# Patient Record
Sex: FEMALE | Race: WHITE | NOT HISPANIC OR LATINO
[De-identification: names, ages, dates, MRNs, and addresses within clinical notes are randomized per-mention and may not be internally consistent; named-entity substitution may affect disease eponyms.]

---

## 2017-11-10 ENCOUNTER — MEDICATION RENEWAL (OUTPATIENT)
Age: 52
End: 2017-11-10

## 2017-11-10 ENCOUNTER — MOBILE ON CALL (OUTPATIENT)
Age: 52
End: 2017-11-10

## 2017-11-10 ENCOUNTER — APPOINTMENT (OUTPATIENT)
Dept: DERMATOLOGY | Facility: CLINIC | Age: 52
End: 2017-11-10
Payer: MEDICAID

## 2017-11-10 VITALS — DIASTOLIC BLOOD PRESSURE: 68 MMHG | SYSTOLIC BLOOD PRESSURE: 130 MMHG

## 2017-11-10 DIAGNOSIS — Z80.8 FAMILY HISTORY OF MALIGNANT NEOPLASM OF OTHER ORGANS OR SYSTEMS: ICD-10-CM

## 2017-11-10 DIAGNOSIS — Z80.9 FAMILY HISTORY OF MALIGNANT NEOPLASM, UNSPECIFIED: ICD-10-CM

## 2017-11-10 PROCEDURE — 99214 OFFICE O/P EST MOD 30 MIN: CPT | Mod: GC

## 2017-11-10 RX ORDER — LIDOCAINE 5 G/100G
5 OINTMENT TOPICAL
Qty: 35 | Refills: 0 | Status: ACTIVE | COMMUNITY
Start: 2017-05-11

## 2017-11-10 RX ORDER — CETIRIZINE HYDROCHLORIDE 10 MG/1
10 CAPSULE, LIQUID FILLED ORAL
Qty: 1 | Refills: 1 | Status: ACTIVE | COMMUNITY
Start: 2017-11-10 | End: 1900-01-01

## 2017-11-10 RX ORDER — DEXAMETHASONE 4 MG/1
4 TABLET ORAL
Qty: 12 | Refills: 0 | Status: ACTIVE | COMMUNITY
Start: 2017-05-15

## 2017-11-12 RX ORDER — CIPROFLOXACIN HYDROCHLORIDE 250 MG/1
250 TABLET, FILM COATED ORAL
Qty: 6 | Refills: 0 | Status: DISCONTINUED | COMMUNITY
Start: 2017-05-22 | End: 2017-11-12

## 2017-11-12 RX ORDER — AZITHROMYCIN 250 MG/1
250 TABLET, FILM COATED ORAL
Qty: 6 | Refills: 0 | Status: DISCONTINUED | COMMUNITY
Start: 2017-07-25 | End: 2017-11-12

## 2017-11-13 ENCOUNTER — MEDICATION RENEWAL (OUTPATIENT)
Age: 52
End: 2017-11-13

## 2017-11-13 RX ORDER — FEXOFENADINE HYDROCHLORIDE 180 MG/1
180 TABLET, FILM COATED ORAL DAILY
Qty: 1 | Refills: 3 | Status: ACTIVE | COMMUNITY
Start: 2017-11-13 | End: 1900-01-01

## 2017-11-13 RX ORDER — LORATADINE 10 MG/1
10 TABLET ORAL
Qty: 1 | Refills: 1 | Status: ACTIVE | COMMUNITY
Start: 2017-11-13 | End: 1900-01-01

## 2017-12-01 ENCOUNTER — OUTPATIENT (OUTPATIENT)
Dept: OUTPATIENT SERVICES | Facility: HOSPITAL | Age: 52
LOS: 1 days | End: 2017-12-01
Payer: MEDICAID

## 2017-12-08 ENCOUNTER — APPOINTMENT (OUTPATIENT)
Dept: DERMATOLOGY | Facility: CLINIC | Age: 52
End: 2017-12-08
Payer: MEDICAID

## 2017-12-08 DIAGNOSIS — L60.1 ONYCHOLYSIS: ICD-10-CM

## 2017-12-08 DIAGNOSIS — I78.1 NEVUS, NON-NEOPLASTIC: ICD-10-CM

## 2017-12-08 PROCEDURE — 99213 OFFICE O/P EST LOW 20 MIN: CPT | Mod: GC

## 2017-12-15 DIAGNOSIS — R69 ILLNESS, UNSPECIFIED: ICD-10-CM

## 2018-01-31 ENCOUNTER — LABORATORY RESULT (OUTPATIENT)
Age: 53
End: 2018-01-31

## 2018-02-01 ENCOUNTER — APPOINTMENT (OUTPATIENT)
Dept: DERMATOLOGY | Facility: CLINIC | Age: 53
End: 2018-02-01
Payer: MEDICAID

## 2018-02-01 ENCOUNTER — MOBILE ON CALL (OUTPATIENT)
Age: 53
End: 2018-02-01

## 2018-02-01 PROCEDURE — 11100 BX SKIN SUBCUTANEOUS&/MUCOUS MEMBRANE 1 LESION: CPT

## 2018-02-01 PROCEDURE — 99214 OFFICE O/P EST MOD 30 MIN: CPT | Mod: 25

## 2018-02-01 RX ORDER — PROCHLORPERAZINE MALEATE 10 MG/1
10 TABLET ORAL
Qty: 30 | Refills: 0 | Status: DISCONTINUED | COMMUNITY
Start: 2017-05-15 | End: 2018-02-01

## 2018-02-01 RX ORDER — CLOBETASOL PROPIONATE 0.5 MG/G
0.05 OINTMENT TOPICAL TWICE DAILY
Qty: 1 | Refills: 2 | Status: ACTIVE | COMMUNITY
Start: 2017-11-10 | End: 1900-01-01

## 2018-02-01 RX ORDER — TAMOXIFEN CITRATE 20 MG/1
20 TABLET, FILM COATED ORAL
Qty: 30 | Refills: 0 | Status: DISCONTINUED | COMMUNITY
Start: 2017-08-07 | End: 2018-02-01

## 2018-02-01 RX ORDER — CHROMIUM 200 MCG
TABLET ORAL
Refills: 0 | Status: ACTIVE | COMMUNITY

## 2018-02-01 RX ORDER — SODIUM SULFATE, POTASSIUM SULFATE, MAGNESIUM SULFATE 17.5; 3.13; 1.6 G/ML; G/ML; G/ML
17.5-3.13-1.6 SOLUTION, CONCENTRATE ORAL
Qty: 354 | Refills: 0 | Status: DISCONTINUED | COMMUNITY
Start: 2017-10-25 | End: 2018-02-01

## 2018-02-01 RX ORDER — BACLOFEN 15 MG/1
TABLET ORAL
Refills: 0 | Status: ACTIVE | COMMUNITY

## 2018-02-13 ENCOUNTER — APPOINTMENT (OUTPATIENT)
Dept: DERMATOLOGY | Facility: CLINIC | Age: 53
End: 2018-02-13
Payer: MEDICAID

## 2018-02-13 DIAGNOSIS — Z48.02 ENCOUNTER FOR REMOVAL OF SUTURES: ICD-10-CM

## 2018-02-13 PROCEDURE — 99214 OFFICE O/P EST MOD 30 MIN: CPT

## 2018-03-01 ENCOUNTER — APPOINTMENT (OUTPATIENT)
Dept: DERMATOLOGY | Facility: CLINIC | Age: 53
End: 2018-03-01
Payer: MEDICAID

## 2018-03-01 VITALS — DIASTOLIC BLOOD PRESSURE: 60 MMHG | SYSTOLIC BLOOD PRESSURE: 120 MMHG

## 2018-03-01 DIAGNOSIS — L30.9 DERMATITIS, UNSPECIFIED: ICD-10-CM

## 2018-03-01 DIAGNOSIS — L58.9 RADIODERMATITIS, UNSPECIFIED: ICD-10-CM

## 2018-03-01 PROCEDURE — 99214 OFFICE O/P EST MOD 30 MIN: CPT

## 2018-03-01 RX ORDER — CLOBETASOL PROPIONATE 0.5 MG/G
0.05 CREAM TOPICAL
Qty: 1 | Refills: 2 | Status: ACTIVE | COMMUNITY
Start: 2018-03-01 | End: 1900-01-01

## 2018-03-13 ENCOUNTER — EMERGENCY (EMERGENCY)
Facility: HOSPITAL | Age: 53
LOS: 1 days | Discharge: HOME | End: 2018-03-13
Attending: EMERGENCY MEDICINE | Admitting: EMERGENCY MEDICINE
Payer: MEDICAID

## 2018-03-13 VITALS
TEMPERATURE: 99 F | RESPIRATION RATE: 16 BRPM | HEART RATE: 107 BPM | DIASTOLIC BLOOD PRESSURE: 78 MMHG | SYSTOLIC BLOOD PRESSURE: 125 MMHG | OXYGEN SATURATION: 98 %

## 2018-03-13 VITALS
DIASTOLIC BLOOD PRESSURE: 68 MMHG | SYSTOLIC BLOOD PRESSURE: 125 MMHG | RESPIRATION RATE: 16 BRPM | OXYGEN SATURATION: 98 % | HEART RATE: 102 BPM | WEIGHT: 156.09 LBS | HEIGHT: 62 IN | TEMPERATURE: 98 F

## 2018-03-13 PROCEDURE — 73130 X-RAY EXAM OF HAND: CPT | Mod: 26,LT

## 2018-03-13 PROCEDURE — 73130 X-RAY EXAM OF HAND: CPT

## 2018-03-13 PROCEDURE — 99283 EMERGENCY DEPT VISIT LOW MDM: CPT | Mod: 25

## 2018-03-13 PROCEDURE — 99283 EMERGENCY DEPT VISIT LOW MDM: CPT

## 2018-03-13 RX ORDER — AZTREONAM 2 G
1 VIAL (EA) INJECTION
Qty: 14 | Refills: 0
Start: 2018-03-13 | End: 2018-03-19

## 2018-03-13 RX ADMIN — Medication 1 TABLET(S): at 18:11

## 2018-03-13 RX ADMIN — Medication 100 MILLIGRAM(S): at 18:11

## 2018-03-13 NOTE — ED ADULT NURSE NOTE - OBJECTIVE STATEMENT
pt states on saturday night she got a piece of glass into her left second finger. pt denies pain but states she has some numbness from the lymphedema . no fevers noted, pt denies any discharge from wound.

## 2018-03-13 NOTE — ED PROVIDER NOTE - PROGRESS NOTE DETAILS
spoke with hand on call Dr. Brooks, case discussed, xrays reviewed, no sign of foreign body, recommend starting antibiotics, will see in the office tomorrow, explanied to the patient and agrees

## 2018-05-31 ENCOUNTER — APPOINTMENT (OUTPATIENT)
Dept: DERMATOLOGY | Facility: CLINIC | Age: 53
End: 2018-05-31

## 2018-06-01 PROCEDURE — G9001: CPT

## 2018-06-01 PROCEDURE — G9005: CPT

## 2018-07-01 ENCOUNTER — OUTPATIENT (OUTPATIENT)
Dept: OUTPATIENT SERVICES | Facility: HOSPITAL | Age: 53
LOS: 1 days | End: 2018-07-01
Payer: MEDICAID

## 2018-07-12 DIAGNOSIS — Z71.89 OTHER SPECIFIED COUNSELING: ICD-10-CM

## 2020-01-01 NOTE — ED PROVIDER NOTE - OBJECTIVE STATEMENT
52 female sent to ER by PMD Dr. Sofi Jaramillo, states on Saturday night a glass jar broke and while trying to clean it out she felt a glass shard enter into her left outer index finger, had minor bleeding and tried to squeeze it out. Since patient has felt foreign body sensation in that area associated with pain. Patient at [facility] has lymphedema of her left arm and rash because of history of double mastectomy with removal of lymph nodes on left arm. Patient denies fever and otherwise feels well. 52 female sent to ER by PMD Dr. Sofi Jaramillo, states on Saturday night a glass jar broke and while trying to clean it out she felt a glass shard enter into her left outer index finger, had minor bleeding and tried to squeeze it out. Since patient has felt foreign body sensation in that area associated with pain. Patient at baseline has lymphedema of her left arm and rash because of history of double mastectomy with removal of lymph nodes on left arm. Patient denies fever and otherwise feels well. <<-----Click here for Discharge Medication Review

## 2020-10-30 PROBLEM — I89.0 LYMPHEDEMA, NOT ELSEWHERE CLASSIFIED: Chronic | Status: ACTIVE | Noted: 2018-03-13

## 2020-10-30 PROBLEM — C50.919 MALIGNANT NEOPLASM OF UNSPECIFIED SITE OF UNSPECIFIED FEMALE BREAST: Chronic | Status: ACTIVE | Noted: 2018-03-13

## 2020-11-18 ENCOUNTER — LABORATORY RESULT (OUTPATIENT)
Age: 55
End: 2020-11-18

## 2020-11-18 ENCOUNTER — APPOINTMENT (OUTPATIENT)
Dept: DERMATOLOGY | Facility: CLINIC | Age: 55
End: 2020-11-18
Payer: MEDICAID

## 2020-11-18 DIAGNOSIS — D23.9 OTHER BENIGN NEOPLASM OF SKIN, UNSPECIFIED: ICD-10-CM

## 2020-11-18 DIAGNOSIS — D48.5 NEOPLASM OF UNCERTAIN BEHAVIOR OF SKIN: ICD-10-CM

## 2020-11-18 DIAGNOSIS — Z12.83 ENCOUNTER FOR SCREENING FOR MALIGNANT NEOPLASM OF SKIN: ICD-10-CM

## 2020-11-18 DIAGNOSIS — L21.9 SEBORRHEIC DERMATITIS, UNSPECIFIED: ICD-10-CM

## 2020-11-18 DIAGNOSIS — L57.0 ACTINIC KERATOSIS: ICD-10-CM

## 2020-11-18 DIAGNOSIS — Z86.03 PERSONAL HISTORY OF NEOPLASM OF UNCERTAIN BEHAVIOR: ICD-10-CM

## 2020-11-18 PROCEDURE — 11102 TANGNTL BX SKIN SINGLE LES: CPT

## 2020-11-18 PROCEDURE — 99214 OFFICE O/P EST MOD 30 MIN: CPT | Mod: 25

## 2020-11-18 PROCEDURE — 17000 DESTRUCT PREMALG LESION: CPT | Mod: 59

## 2020-11-20 ENCOUNTER — TRANSCRIPTION ENCOUNTER (OUTPATIENT)
Age: 55
End: 2020-11-20

## 2020-11-24 ENCOUNTER — NON-APPOINTMENT (OUTPATIENT)
Age: 55
End: 2020-11-24

## 2020-11-30 ENCOUNTER — APPOINTMENT (OUTPATIENT)
Dept: DERMATOLOGY | Facility: CLINIC | Age: 55
End: 2020-11-30

## 2021-07-09 ENCOUNTER — TRANSCRIPTION ENCOUNTER (OUTPATIENT)
Age: 56
End: 2021-07-09

## 2021-09-01 PROCEDURE — G9005: CPT

## 2021-12-09 ENCOUNTER — EMERGENCY (EMERGENCY)
Facility: HOSPITAL | Age: 56
LOS: 1 days | Discharge: HOME | End: 2021-12-09
Attending: EMERGENCY MEDICINE | Admitting: EMERGENCY MEDICINE
Payer: MEDICAID

## 2021-12-09 VITALS
WEIGHT: 154.98 LBS | SYSTOLIC BLOOD PRESSURE: 127 MMHG | HEART RATE: 92 BPM | RESPIRATION RATE: 18 BRPM | TEMPERATURE: 98 F | HEIGHT: 62 IN | DIASTOLIC BLOOD PRESSURE: 76 MMHG | OXYGEN SATURATION: 98 %

## 2021-12-09 PROCEDURE — 99283 EMERGENCY DEPT VISIT LOW MDM: CPT

## 2021-12-09 PROCEDURE — 99284 EMERGENCY DEPT VISIT MOD MDM: CPT

## 2021-12-09 RX ORDER — CYCLOBENZAPRINE HYDROCHLORIDE 10 MG/1
10 TABLET, FILM COATED ORAL ONCE
Refills: 0 | Status: COMPLETED | OUTPATIENT
Start: 2021-12-09 | End: 2021-12-09

## 2021-12-09 RX ORDER — IBUPROFEN 200 MG
600 TABLET ORAL ONCE
Refills: 0 | Status: COMPLETED | OUTPATIENT
Start: 2021-12-09 | End: 2021-12-09

## 2021-12-09 RX ORDER — DIMETHYL FUMARATE 240 MG/1
1 CAPSULE ORAL
Qty: 0 | Refills: 0 | DISCHARGE

## 2021-12-09 RX ORDER — CYCLOBENZAPRINE HYDROCHLORIDE 10 MG/1
1 TABLET, FILM COATED ORAL
Qty: 15 | Refills: 0
Start: 2021-12-09 | End: 2021-12-13

## 2021-12-09 RX ORDER — ASPIRIN/CALCIUM CARB/MAGNESIUM 324 MG
1 TABLET ORAL
Qty: 0 | Refills: 0 | DISCHARGE

## 2021-12-09 RX ORDER — OXYCODONE AND ACETAMINOPHEN 5; 325 MG/1; MG/1
1 TABLET ORAL ONCE
Refills: 0 | Status: DISCONTINUED | OUTPATIENT
Start: 2021-12-09 | End: 2021-12-09

## 2021-12-09 RX ADMIN — Medication 600 MILLIGRAM(S): at 03:41

## 2021-12-09 RX ADMIN — CYCLOBENZAPRINE HYDROCHLORIDE 10 MILLIGRAM(S): 10 TABLET, FILM COATED ORAL at 03:41

## 2021-12-09 RX ADMIN — Medication 600 MILLIGRAM(S): at 04:32

## 2021-12-09 NOTE — ED PROVIDER NOTE - PATIENT PORTAL LINK FT
You can access the FollowMyHealth Patient Portal offered by [facility] by registering at the following website: [email or web address]. By joining [employer]’s FollowMyHealth portal, you will also be able to view your health information using other applications (apps) compatible with our system.

## 2021-12-09 NOTE — ED ADULT NURSE NOTE - NSIMPLEMENTINTERV_GEN_ALL_ED
Implemented All Fall with Harm Risk Interventions:  [place] to call system. Call bell, personal items and telephone within reach. Instruct patient to call for assistance. Room bathroom lighting operational. Non-slip footwear when patient is off stretcher. Physically safe environment: no spills, clutter or unnecessary equipment. Stretcher in lowest position, wheels locked, appropriate side rails in place. Provide visual cue, wrist band, yellow gown, etc. Monitor gait and stability. Monitor for mental status changes and reorient to person, place, and time. Review medications for side effects contributing to fall risk. Reinforce activity limits and safety measures with patient and family. Provide visual clues: red socks. Island Pedicle Flap-Requiring Vessel Identification Text: The defect edges were debeveled with a #15 scalpel blade.  Given the location of the defect, shape of the defect and the proximity to free margins an island pedicle advancement flap was deemed most appropriate.  Using a sterile surgical marker, an appropriate advancement flap was drawn, based on the axial vessel mentioned above, incorporating the defect, outlining the appropriate donor tissue and placing the expected incisions within the relaxed skin tension lines where possible.    The area thus outlined was incised deep to adipose tissue with a #15 scalpel blade.  The skin margins were undermined to an appropriate distance in all directions around the primary defect and laterally outward around the island pedicle utilizing iris scissors.  There was minimal undermining beneath the pedicle flap.

## 2021-12-09 NOTE — ED PROVIDER NOTE - OBJECTIVE STATEMENT
57yo female who presents with sudden onset of back pain, left sided, radiating to buttock, no tingling or numbness, pt did take anything for the pain

## 2023-01-31 ENCOUNTER — OFFICE (OUTPATIENT)
Dept: URBAN - METROPOLITAN AREA CLINIC 109 | Facility: CLINIC | Age: 58
Setting detail: OPHTHALMOLOGY
End: 2023-01-31
Payer: COMMERCIAL

## 2023-01-31 DIAGNOSIS — H01.004: ICD-10-CM

## 2023-01-31 DIAGNOSIS — H01.002: ICD-10-CM

## 2023-01-31 DIAGNOSIS — H25.13: ICD-10-CM

## 2023-01-31 DIAGNOSIS — H01.001: ICD-10-CM

## 2023-01-31 DIAGNOSIS — H01.005: ICD-10-CM

## 2023-01-31 PROCEDURE — 92014 COMPRE OPH EXAM EST PT 1/>: CPT | Performed by: OPHTHALMOLOGY

## 2023-01-31 ASSESSMENT — REFRACTION_CURRENTRX
OD_OVR_VA: 20/
OS_OVR_VA: 20/
OD_SPHERE: +1.50
OS_SPHERE: +1.50

## 2023-01-31 ASSESSMENT — LID EXAM ASSESSMENTS
OD_BLEPHARITIS: RLL RUL 2+
OD_MEIBOMITIS: RLL RUL 2+
OD_COMMENTS: COLLARETTES UL COMMENTS
OS_MEIBOMITIS: LLL LUL 2+
OS_BLEPHARITIS: LLL LUL 2+
OS_COMMENTS: COLLARETTES UL COMMENTS

## 2023-01-31 ASSESSMENT — CONFRONTATIONAL VISUAL FIELD TEST (CVF)
OS_FINDINGS: FULL
OD_FINDINGS: FULL

## 2023-01-31 ASSESSMENT — SPHEQUIV_DERIVED: OS_SPHEQUIV: 0.625

## 2023-01-31 ASSESSMENT — REFRACTION_AUTOREFRACTION
OS_AXIS: 69
OD_SPHERE: +0.75
OS_CYLINDER: -0.25
OS_SPHERE: +0.75

## 2023-01-31 ASSESSMENT — REFRACTION_MANIFEST
OD_SPHERE: +0.75
OS_VA1: 20/20
OS_SPHERE: +0.75
OD_VA1: 20/20
OS_SPHERE: +1.75
OD_SPHERE: +1.75

## 2024-01-11 ENCOUNTER — OUTPATIENT (OUTPATIENT)
Dept: OUTPATIENT SERVICES | Facility: HOSPITAL | Age: 59
LOS: 1 days | End: 2024-01-11

## 2024-01-11 ENCOUNTER — APPOINTMENT (OUTPATIENT)
Dept: INTERNAL MEDICINE | Facility: CLINIC | Age: 59
End: 2024-01-11

## 2024-03-22 ENCOUNTER — APPOINTMENT (OUTPATIENT)
Dept: NEUROLOGY | Facility: CLINIC | Age: 59
End: 2024-03-22
Payer: COMMERCIAL

## 2024-03-22 VITALS
HEIGHT: 62 IN | WEIGHT: 153 LBS | HEART RATE: 96 BPM | BODY MASS INDEX: 28.16 KG/M2 | SYSTOLIC BLOOD PRESSURE: 138 MMHG | DIASTOLIC BLOOD PRESSURE: 72 MMHG

## 2024-03-22 DIAGNOSIS — G35 MULTIPLE SCLEROSIS: ICD-10-CM

## 2024-03-22 PROCEDURE — 99205 OFFICE O/P NEW HI 60 MIN: CPT

## 2024-03-22 NOTE — PHYSICAL EXAM
[FreeTextEntry1] : PHYSICAL EXAM Constitutional: Alert, no acute distress  Psychiatric: appropriate affect and mood Pulmonary: No respiratory distress, stable on room air  NEUROLOGICAL EXAM Mental status: The patient is alert, attentive and conversational memory intact. AO x 3 Speech/language: No dysarthria Cranial nerves: CN II: Visual fields are full to confrontation. Pupil size equal and briskly reactive to light.  CN III, IV, VI: EOMI, no nystagmus, no ptosis CN V: Facial sensation is intact to pinprick in all 3 divisions bilaterally. CN VII: Face is symmetric with normal eye closure and smile. CN VII: Hearing is normal to rubbing fingers CN IX, X: Palate elevates symmetrically.  CN XI: Head turning and shoulder shrug are intact CN XII: Tongue is midline with normal movements and no atrophy. Motor: 5/5 b/l UE and RLE           LLE: 4/5 HF, 5/5 KE and KF, spastic below the ankle (involuntary movements of toes- spasms) Reflexes: R        L  Biceps    3+       3+  Patellar   2+       3+  Achilles  2+       3+ Plantar responses- R down, L up Sensory:  Diminished sensation to LT/PP/VIB in distal LLE. JPS of L big toe impaired.  Coordination/Cerebellar: There is no dysmetria on finger-to-nose and heel to shin (difficulty on the L 2/2 weakness).  Gait/Stance: LLE circumduction. Has AFO on the right.

## 2024-03-22 NOTE — DATA REVIEWED
[de-identified] : I have personally reviewed MR imaging from [facility]:- MR brain w/w/o contrast 5/2023- stable dating back to 2011. Scattered T2 WM lesions in b/l cerebral hemisphere (L>R), CC lesion, L temporal lesion, R periatrial lesion. No definite odom finger appearing lesions and no infratentorial lesion noted.  MR C spine: stable scans from 2018 to 2011. Several scattered short segment cord lesions at - C2/3, C4/5, C6/7, C7/T1 and T2.  MR T spine: Most recent scan 2016 stable dating back to 2011. On 2011 scan, there was an active enhancing lesion at T7-8 and several other non enhancing short segment lesions (right T1, right T2, left T5, right ventral T6, and central T9)

## 2024-03-22 NOTE — ASSESSMENT
[FreeTextEntry1] : Assessment/Plan:  58 year old female w/ hx of multiple sclerosis diagnosed in her 20's with initial attack of right optic neuritis (resolved) and now with a slowly progressive LLE weakness (L foot drop) since ~ 2014.   # Secondary progressive MS (non-active, progressing), on DMF since 2018/2019.  Plan:- [] MR brain, C and T spine w/w/o contrast ([facility] zapata) for radiological stability [] Continue DMF. Consider switching to B cell depleting therapy given progressive disease.  [] DMT labs ordered. [] Check serum NMO, MOG, ACE, HTLV.  [] Check Vitamin D level   Return to clinic 1 month  The above plan was discussed with GOLD MARQUEZ in great detail.  GOLD MARQUEZ verbalized understanding and agrees with plan as detailed above. Patient was provided education and counselling on current diagnosis/symptoms. She was advised to call our clinic at [phone] for any new or worsening symptoms, or with any questions or concerns. GOLD MARQUEZ expressed understanding and all her questions/concerns were addressed.  Cherelle Gonzales M.D

## 2024-03-22 NOTE — HISTORY OF PRESENT ILLNESS
[FreeTextEntry1] : HPI (initial visit Mar 22, 2024)- GOLD MARQUEZ is a 58-year-old left handed woman, here to establish care for hx of multiple sclerosis. She is currently on dimethyl fumarate.   She had seen Dr Adrianne Hernández in the past (2014). She was diagnosed with MS at age 22 (right optic neuritis, resolved). NMO ab (MIKEY) negative 2014. She also had a spinal tap which she states confirmed MS at age 26.  She has been followed by Dr Diaz and Dr Damico. She has been on DMF since 2018/2019.  This is her first drug, on generic since 2023. After her bout of optic neuritis in her 20's, she states she was doing stable and did not have any symptoms until about ~ 2014. She began to notice L foot drop and Leg weakness. She was dragging L leg. She believes this has gotten slowly worse over the years and now in a foot brace and uses a walker for long distances. I received Dr Damico's most recent note and he confirmed diagnosis of SPMS. She also reports more numbness in R foot in the last year.    Bladder function okay. No constipation.  LE muscle spasms. Cramps are not often. Tried baclofen and believes it made her "spacey".  She just completed PT back in 11/2023.  Med hx: MS, breast cancer 2017(chemo, rad, surgery), Lyme disease (treated w/ IV abx), Lymphedema LUE post mastectomy Sx hx: C section, Hernia repair, b/l mastectomy Family hx: mother had breast cancer.  Social hx: denies smoking, alcohol or drugs.  Meds: Dimethyl fumarate Allergies: Tamoxifen, penicillin    at [facility].

## 2024-04-19 ENCOUNTER — APPOINTMENT (OUTPATIENT)
Dept: OBGYN | Facility: CLINIC | Age: 59
End: 2024-04-19
Payer: COMMERCIAL

## 2024-04-19 VITALS
HEIGHT: 62 IN | BODY MASS INDEX: 27.97 KG/M2 | SYSTOLIC BLOOD PRESSURE: 126 MMHG | WEIGHT: 152 LBS | DIASTOLIC BLOOD PRESSURE: 78 MMHG

## 2024-04-19 DIAGNOSIS — R30.0 DYSURIA: ICD-10-CM

## 2024-04-19 DIAGNOSIS — Z01.419 ENCOUNTER FOR GYNECOLOGICAL EXAMINATION (GENERAL) (ROUTINE) W/OUT ABNORMAL FINDINGS: ICD-10-CM

## 2024-04-19 DIAGNOSIS — R10.2 PELVIC AND PERINEAL PAIN: ICD-10-CM

## 2024-04-19 PROCEDURE — 99459 PELVIC EXAMINATION: CPT

## 2024-04-19 PROCEDURE — 99386 PREV VISIT NEW AGE 40-64: CPT

## 2024-04-19 NOTE — REVIEW OF SYSTEMS
[Dysuria] : dysuria [Pelvic pain] : pelvic pain [Genital Rash/Irritation] : genital rash/irritation [Negative] : Breast

## 2024-04-20 NOTE — HISTORY OF PRESENT ILLNESS
[TextBox_4] : pt presents for WWE and multiple complaints  reports feeling nonspecific pelvic pain and vaginal discomfort- feels intermittent irritation in the inguinal areas bilaterally  known h/o MS and feels some symptoms may be related to her MS  sometimes feels numbness in lower abdomen/pelvic area  reports discomfort with urination, denies frequency or urgency h/o b/l mastectomy with recontruction and implant placement

## 2024-04-20 NOTE — PHYSICAL EXAM
[Chaperone Present] : A chaperone was present in the examining room during all aspects of the physical examination [31497] : A chaperone was present during the pelvic exam. [Soft] : soft [Non-tender] : non-tender [Non-distended] : non-distended [] : implants [Breast Reconstruction Right] : breast reconstruction [___] : a [unfilled] ~Ucm mastectomy scar [Breast Reconstruction Left] : breast reconstruction [No Masses] : no breast masses were palpable [Labia Majora] : normal [Labia Minora] : normal [Normal] : normal [Uterine Adnexae] : normal [FreeTextEntry2] : geri

## 2024-05-15 ENCOUNTER — APPOINTMENT (OUTPATIENT)
Dept: OBGYN | Facility: CLINIC | Age: 59
End: 2024-05-15
Payer: COMMERCIAL

## 2024-05-15 ENCOUNTER — ASOB RESULT (OUTPATIENT)
Age: 59
End: 2024-05-15

## 2024-05-15 PROCEDURE — 76830 TRANSVAGINAL US NON-OB: CPT

## 2024-05-23 LAB
BACTERIA UR CULT: NORMAL
CANDIDA VAG CYTO: NOT DETECTED
CYTOLOGY CVX/VAG DOC THIN PREP: NORMAL
G VAGINALIS+PREV SP MTYP VAG QL MICRO: NOT DETECTED
HPV HIGH+LOW RISK DNA PNL CVX: NOT DETECTED
T VAGINALIS VAG QL WET PREP: NOT DETECTED

## 2024-08-23 DIAGNOSIS — G35 MULTIPLE SCLEROSIS: ICD-10-CM

## 2024-08-23 RX ORDER — DIAZEPAM 5 MG/1
5 TABLET ORAL
Qty: 1 | Refills: 0 | Status: ACTIVE | COMMUNITY
Start: 2024-08-23 | End: 1900-01-01

## 2024-08-30 ENCOUNTER — APPOINTMENT (OUTPATIENT)
Dept: MRI IMAGING | Facility: CLINIC | Age: 59
End: 2024-08-30

## 2024-08-30 ENCOUNTER — APPOINTMENT (OUTPATIENT)
Dept: NEUROLOGY | Facility: CLINIC | Age: 59
End: 2024-08-30

## 2024-08-30 PROCEDURE — 70553 MRI BRAIN STEM W/O & W/DYE: CPT

## 2024-08-30 PROCEDURE — 0866T QUAN MRI ALYS BRN W/DX MRI: CPT

## 2024-08-30 PROCEDURE — A9585: CPT

## 2024-08-30 PROCEDURE — 72156 MRI NECK SPINE W/O & W/DYE: CPT

## 2024-08-30 PROCEDURE — 72157 MRI CHEST SPINE W/O & W/DYE: CPT

## 2024-09-06 ENCOUNTER — NON-APPOINTMENT (OUTPATIENT)
Age: 59
End: 2024-09-06

## 2024-09-11 ENCOUNTER — APPOINTMENT (OUTPATIENT)
Dept: NEUROLOGY | Facility: CLINIC | Age: 59
End: 2024-09-11
Payer: COMMERCIAL

## 2024-09-11 VITALS
BODY MASS INDEX: 27.97 KG/M2 | DIASTOLIC BLOOD PRESSURE: 70 MMHG | WEIGHT: 152 LBS | SYSTOLIC BLOOD PRESSURE: 120 MMHG | OXYGEN SATURATION: 100 % | HEART RATE: 88 BPM | HEIGHT: 62 IN

## 2024-09-11 DIAGNOSIS — G35 MULTIPLE SCLEROSIS: ICD-10-CM

## 2024-09-11 DIAGNOSIS — M51.04 INTERVERTEBRAL DISC DISORDERS WITH MYELOPATHY, THORACIC REGION: ICD-10-CM

## 2024-09-11 PROCEDURE — G2211 COMPLEX E/M VISIT ADD ON: CPT

## 2024-09-11 PROCEDURE — 99215 OFFICE O/P EST HI 40 MIN: CPT

## 2024-09-11 NOTE — REASON FOR VISIT
Patient Education        Seizure: Care Instructions  Your Care Instructions    Seizures are caused by abnormal patterns of electrical signals in the brain. They are different for each person. Seizures can affect movement, speech, vision, or awareness. Some people have only slight shaking of a hand and do not pass out. Other people may pass out and have violent shaking of the whole body. Some people appear to stare into space. They are awake, but they can't respond normally. Later, they may not remember what happened. You may need tests to identify the type and cause of the seizures. A seizure may occur only once, or you may have them more than one time. Taking medicines as directed and following up with your doctor may help keep you from having more seizures. The doctor has checked you carefully, but problems can develop later. If you notice any problems or new symptoms, get medical treatment right away. Follow-up care is a key part of your treatment and safety. Be sure to make and go to all appointments, and call your doctor if you are having problems. It's also a good idea to know your test results and keep a list of the medicines you take. How can you care for yourself at home? · Be safe with medicines. Take your medicines exactly as prescribed. Call your doctor if you think you are having a problem with your medicine. · Do not do any activity that could be dangerous to you or others until your doctor says it is safe to do so. For example, do not drive a car, operate machinery, swim, or climb ladders. · Be sure that anyone treating you for any health problem knows that you have had a seizure and what medicines you are taking for it. · Identify and avoid things that may make you more likely to have a seizure. These may include lack of sleep, alcohol or drug use, stress, or not eating. · Make sure you go to your follow-up appointment. When should you call for help?   Call 911 anytime you think you may need emergency care. For example, call if:    · You have another seizure.     · You have more than one seizure in 24 hours.     · You have new symptoms, such as trouble walking, speaking, or thinking clearly.    Call your doctor now or seek immediate medical care if:    · You are not acting normally.    Watch closely for changes in your health, and be sure to contact your doctor if you have any problems. Where can you learn more? Go to http://kassie-antonio.info/. Enter W016 in the search box to learn more about \"Seizure: Care Instructions. \"  Current as of: Grace 3, 2018  Content Version: 11.9  © 8541-9624 [employer]. Care instructions adapted under license by [employer] (which disclaims liability or warranty for this information). If you have questions about a medical condition or this instruction, always ask your healthcare professional. [address] any warranty or liability for your use of this information. [Follow-Up: _____] : a [unfilled] follow-up visit

## 2024-09-12 RX ORDER — DIMETHYL FUMARATE 240 MG/1
240 CAPSULE, DELAYED RELEASE ORAL
Qty: 60 | Refills: 6 | Status: ACTIVE | COMMUNITY
Start: 2024-09-11 | End: 1900-01-01

## 2024-09-12 NOTE — DATA REVIEWED
[de-identified] : I personally reviewed all MR imaging and compared with ZP. Also discussed with neurorad. MRI brain w/w/o contrast 8/30/2024- stable WM lesions c/w 5/2023 ZP scan. No enhancement. MR C spine w/w/o contrast 8/30/2024- stable cord lesions (central C1, most prominent lesion at left C4/5 and another lesion at left C7). There is ? enhancement in region of C4/5 lesion- seen on both axial and sagittal sequence. (c/w C spine MR from 2018) MR T spine w/w/o contrast 8/30/2024- similar appearing T cord lesions, c/w 2016 MRI. Punctuate L dorsal lesion at T5, T7-8 cord lesion, dorsal T9 and ventral T10/11. No enhancement. left parasagittal disc herniation noted at T7-8 that seems to cause spinal cord compression at this level.  I have personally reviewed MR imaging from [facility]:- MR brain w/w/o contrast 5/2023- stable dating back to 2011. Scattered T2 WM lesions in b/l cerebral hemisphere (L>R), CC lesion, L temporal lesion, R periatrial lesion. No definite odom finger appearing lesions and no infratentorial lesion noted.  MR C spine: stable scans from 2018 to 2011. Several scattered short segment cord lesions at - C2/3, C4/5, C6/7, C7/T1 and T2.  MR T spine: Most recent scan 2016 stable dating back to 2011. On 2011 scan, there was an active enhancing lesion at T7-8 and several other non enhancing short segment lesions (right T1, right T2, left T5, right ventral T6, and central T9)  [de-identified] : Labs 7/2024- NMO neg, MOG neg. ACE neg Quant TB neg, HTLV neg, HEp B panel neg. Vit D 44.7, JCV ab neg, VZV igM +, VZV IgG +, CBC wnl, LFT wnl. IgG panel with mild elevation in IgM (313).

## 2024-09-12 NOTE — ASSESSMENT
[FreeTextEntry1] : Assessment/Plan:  59 year old female w/ hx of multiple sclerosis diagnosed in her 20's with initial attack of right optic neuritis (resolved) and now with a slowly progressive LLE weakness (L foot drop) since ~ 2014.  # Secondary progressive MS (non-active, progressing), on DMF since 2018/2019. 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  There is ? new enhancement in region of a chronic C4/5 lesion. T cord lesion is stable with T7/8 leftward disc herniation with ? compression myelopathy at this region.  Of note, pt has reported progressive LLE weakness/spasticity since 2014. This disc protrusion was also noted on T spine MRI in 2016 but seems to have progressed. On 2011 scan this disc protrusion was not prominent but there was a definite demyelinating lesion at T7/8 with enhancement that seemed consistent with demyelination. * concern for superimposed compressive myelopathy ?? *  Neurological exam stable.   Plan:- [] Will refer to neurosurgery for opinion on thoracic disc herniation with possible superimposed compressive myelopathy at T7/8 causing progressive neurological symptoms.  [] Continue DMF (refilled). Consider switching to B cell depleting therapy vs Mayzent given progressive disease (pt wishes to defer on this for now)  Return to clinic 6 months  The above plan was discussed with GOLD MARQUEZ in great detail. GOLD MARQUEZ verbalized understanding and agrees with plan as detailed above. Patient was provided education and counselling on current diagnosis/symptoms. She was advised to call our clinic at [phone] for any new or worsening symptoms, or with any questions or concerns. GOLD MARQUEZ expressed understanding and all her questions/concerns were addressed.  Cherelle Gonzales M.D.

## 2024-09-12 NOTE — HISTORY OF PRESENT ILLNESS
[FreeTextEntry1] : INTERIM HX 09/11/2024: Pt reports since our last visit she has been experiencing more numbness in R toes.  I personally reviewed all MR imaging and compared with ZP. Also discussed with neurorad. MRI brain w/w/o contrast 8/30/2024- stable WM lesions c/w 5/2023 ZP scan. No enhancement. MR C spine w/w/o contrast 8/30/2024- stable cord lesions (central C1, most prominent lesion at left C4/5 and another lesion at left C7). There is ? enhancement in region of C4/5 lesion- seen on both axial and sagittal sequence. (compared w/ C spine MR from 2018) MR T spine w/w/o contrast 8/30/2024- similar appearing T cord lesions, c/w 2016 MRI. Punctuate L dorsal lesion at T5, T7-8 cord lesion, dorsal T9 and ventral T10/11. No enhancement. left parasagittal disc herniation noted at T7-8 that seems to cause spinal cord compression at this level.  overall, brain and C spine stable. There is ? new enhancement in region of a chronic C4/5 lesion. T cord lesion is stable with T7/8 disc herniation with ? compression myelopathy at this region. Of note, pt has reported progressive LL weakness/spasticity since 2014. This disc protrusion was also noted on T spine MRI in 2016 but seems to have progressed. On 2011 scan this disc protrusion was not prominent but there was a definite demyelinating lesion at T7/8 with enhancement that seemed consistent with demyelination. * possible concern for superimposed compressive myelopathy ?? *  --------------------------------------- HPI (initial visit Mar 22, 2024)- GOLD MARQUEZ is a 58-year-old left handed woman, here to establish care for hx of multiple sclerosis. She is currently on dimethyl fumarate.   She had seen Dr Adrianne Hernández in the past (2014). She was diagnosed with MS at age 22 (right optic neuritis, resolved). NMO ab (MIKEY) negative 2014. She also had a spinal tap which she states confirmed MS at age 26.  She has been followed by Dr Diaz and Dr Damico. She has been on DMF since 2018/2019.  This is her first drug, on generic since 2023. After her bout of optic neuritis in her 20's, she states she was doing stable and did not have any symptoms until about ~ 2014. She began to notice L foot drop and Leg weakness. She was dragging L leg. She believes this has gotten slowly worse over the years and now in a foot brace and uses a walker for long distances. I received Dr Damico's most recent note and he confirmed diagnosis of SPMS. She also reports more numbness in R foot in the last year.    Bladder function okay. No constipation.  LE muscle spasms. Cramps are not often. Tried baclofen and believes it made her "spacey".  She just completed PT back in 11/2023.  Med hx: MS, breast cancer 2017(chemo, rad, surgery), Lyme disease (treated w/ IV abx), Lymphedema LUE post mastectomy Sx hx: C section, Hernia repair, b/l mastectomy Family hx: mother had breast cancer.  Social hx: denies smoking, alcohol or drugs.  Meds: Dimethyl fumarate Allergies: Tamoxifen, penicillin    at [facility].

## 2024-09-12 NOTE — HISTORY OF PRESENT ILLNESS
[FreeTextEntry1] : INTERIM HX 09/11/2024: Pt reports since our last visit she has been experiencing more numbness in R toes.  I personally reviewed all MR imaging and compared with ZP. Also discussed with neurorad. MRI brain w/w/o contrast 8/30/2024- stable WM lesions c/w 5/2023 ZP scan. No enhancement. MR C spine w/w/o contrast 8/30/2024- stable cord lesions (central C1, most prominent lesion at left C4/5 and another lesion at left C7). There is ? enhancement in region of C4/5 lesion- seen on both axial and sagittal sequence. (compared w/ C spine MR from 2018) MR T spine w/w/o contrast 8/30/2024- similar appearing T cord lesions, c/w 2016 MRI. Punctuate L dorsal lesion at T5, T7-8 cord lesion, dorsal T9 and ventral T10/11. No enhancement. left parasagittal disc herniation noted at T7-8 that seems to cause spinal cord compression at this level.  overall, brain and C spine stable. There is ? new enhancement in region of a chronic C4/5 lesion. T cord lesion is stable with T7/8 disc herniation with ? compression myelopathy at this region. Of note, pt has reported progressive LL weakness/spasticity since 2014. This disc protrusion was also noted on T spine MRI in 2016 but seems to have progressed. On 2011 scan this disc protrusion was not prominent but there was a definite demyelinating lesion at T7/8 with enhancement that seemed consistent with demyelination. * possible concern for superimposed compressive myelopathy ?? *  --------------------------------------- HPI (initial visit Mar 22, 2024)- GOLD MARQUEZ is a 58-year-old left handed woman, here to establish care for hx of multiple sclerosis. She is currently on dimethyl fumarate.   She had seen Dr Adrianne Hernández in the past (2014). She was diagnosed with MS at age 22 (right optic neuritis, resolved). NMO ab (MIKEY) negative 2014. She also had a spinal tap which she states confirmed MS at age 26.  She has been followed by Dr Diaz and Dr Damico. She has been on DMF since 2018/2019.  This is her first drug, on generic since 2023. After her bout of optic neuritis in her 20's, she states she was doing stable and did not have any symptoms until about ~ 2014. She began to notice L foot drop and Leg weakness. She was dragging L leg. She believes this has gotten slowly worse over the years and now in a foot brace and uses a walker for long distances. I received Dr Damico's most recent note and he confirmed diagnosis of SPMS. She also reports more numbness in R foot in the last year.    Bladder function okay. No constipation.  LE muscle spasms. Cramps are not often. Tried baclofen and believes it made her "spacey".  She just completed PT back in 11/2023.  Med hx: MS, breast cancer 2017(chemo, rad, surgery), Lyme disease (treated w/ IV abx), Lymphedema LUE post mastectomy Sx hx: C section, Hernia repair, b/l mastectomy Family hx: mother had breast cancer.  Social hx: denies smoking, alcohol or drugs.  Meds: Dimethyl fumarate Allergies: Tamoxifen, penicillin    at [facility].

## 2024-09-12 NOTE — DATA REVIEWED
[de-identified] : I personally reviewed all MR imaging and compared with ZP. Also discussed with neurorad. MRI brain w/w/o contrast 8/30/2024- stable WM lesions c/w 5/2023 ZP scan. No enhancement. MR C spine w/w/o contrast 8/30/2024- stable cord lesions (central C1, most prominent lesion at left C4/5 and another lesion at left C7). There is ? enhancement in region of C4/5 lesion- seen on both axial and sagittal sequence. (c/w C spine MR from 2018) MR T spine w/w/o contrast 8/30/2024- similar appearing T cord lesions, c/w 2016 MRI. Punctuate L dorsal lesion at T5, T7-8 cord lesion, dorsal T9 and ventral T10/11. No enhancement. left parasagittal disc herniation noted at T7-8 that seems to cause spinal cord compression at this level.  I have personally reviewed MR imaging from [facility]:- MR brain w/w/o contrast 5/2023- stable dating back to 2011. Scattered T2 WM lesions in b/l cerebral hemisphere (L>R), CC lesion, L temporal lesion, R periatrial lesion. No definite odom finger appearing lesions and no infratentorial lesion noted.  MR C spine: stable scans from 2018 to 2011. Several scattered short segment cord lesions at - C2/3, C4/5, C6/7, C7/T1 and T2.  MR T spine: Most recent scan 2016 stable dating back to 2011. On 2011 scan, there was an active enhancing lesion at T7-8 and several other non enhancing short segment lesions (right T1, right T2, left T5, right ventral T6, and central T9)  [de-identified] : Labs 7/2024- NMO neg, MOG neg. ACE neg Quant TB neg, HTLV neg, HEp B panel neg. Vit D 44.7, JCV ab neg, VZV igM +, VZV IgG +, CBC wnl, LFT wnl. IgG panel with mild elevation in IgM (313).

## 2024-09-12 NOTE — ASSESSMENT
[FreeTextEntry1] : Assessment/Plan:  59 year old female w/ hx of multiple sclerosis diagnosed in her 20's with initial attack of right optic neuritis (resolved) and now with a slowly progressive LLE weakness (L foot drop) since ~ 2014.  # Secondary progressive MS (non-active, progressing), on DMF since 2018/2019. 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  There is ? new enhancement in region of a chronic C4/5 lesion. T cord lesion is stable with T7/8 leftward disc herniation with ? compression myelopathy at this region.  Of note, pt has reported progressive LLE weakness/spasticity since 2014. This disc protrusion was also noted on T spine MRI in 2016 but seems to have progressed. On 2011 scan this disc protrusion was not prominent but there was a definite demyelinating lesion at T7/8 with enhancement that seemed consistent with demyelination. * concern for superimposed compressive myelopathy ?? *  Neurological exam stable.   Plan:- [] Will refer to neurosurgery for opinion on thoracic disc herniation with possible superimposed compressive myelopathy at T7/8 causing progressive neurological symptoms.  [] Continue DMF (refilled). Consider switching to B cell depleting therapy vs Mayzent given progressive disease (pt wishes to defer on this for now)  Return to clinic 6 months  The above plan was discussed with GOLD MARQUEZ in great detail. GOLD MARQUEZ verbalized understanding and agrees with plan as detailed above. Patient was provided education and counselling on current diagnosis/symptoms. She was advised to call our clinic at [phone] for any new or worsening symptoms, or with any questions or concerns. GOLD MARQUEZ expressed understanding and all her questions/concerns were addressed.  Cherelle Gonzales M.D.

## 2024-09-12 NOTE — PHYSICAL EXAM
[FreeTextEntry1] : PHYSICAL EXAM Constitutional: Alert, no acute distress Psychiatric: appropriate affect and mood Pulmonary: No respiratory distress, stable on room air  NEUROLOGICAL EXAM Mental status: The patient is alert, attentive and conversational memory intact. AO x 3 Speech/language: No dysarthria Cranial nerves: CN II: Visual fields are full to confrontation. Pupil size equal and briskly reactive to light. CN III, IV, VI: EOMI, no nystagmus, no ptosis CN V: Facial sensation is intact to pinprick in all 3 divisions bilaterally. CN VII: Face is symmetric with normal eye closure and smile. CN VII: Hearing is normal to rubbing fingers CN IX, X: Palate elevates symmetrically. CN XI: Head turning and shoulder shrug are intact CN XII: Tongue is midline with normal movements and no atrophy. Motor: 5/5 b/l UE and RLE  LLE: 4/5 HF, 5/5 KE and KF, spastic below the ankle (involuntary movements of toes- spasms) able to raise toes and 4/5 PF. Reflexes: R L  Biceps 3+ 3+  Patellar 2+ 3+  Achilles 2+ 3+ Plantar responses- R down, L up Sensory: Diminished sensation to LT/PP/VIB in distal LLE. JPS of L big toe impaired. Coordination/Cerebellar: There is no dysmetria on finger-to-nose and heel to shin (difficulty on the L 2/2 weakness). Gait/Stance: LLE circumduction. Has AFO on the right.

## 2024-10-29 ENCOUNTER — APPOINTMENT (OUTPATIENT)
Dept: OPHTHALMOLOGY | Facility: CLINIC | Age: 59
End: 2024-10-29
Payer: COMMERCIAL

## 2024-10-29 ENCOUNTER — NON-APPOINTMENT (OUTPATIENT)
Age: 59
End: 2024-10-29

## 2024-10-29 PROCEDURE — 92004 COMPRE OPH EXAM NEW PT 1/>: CPT

## 2024-10-29 PROCEDURE — 92133 CPTRZD OPH DX IMG PST SGM ON: CPT

## 2024-11-13 ENCOUNTER — APPOINTMENT (OUTPATIENT)
Dept: OBGYN | Facility: CLINIC | Age: 59
End: 2024-11-13
Payer: COMMERCIAL

## 2024-11-13 VITALS
HEIGHT: 62 IN | DIASTOLIC BLOOD PRESSURE: 80 MMHG | WEIGHT: 153 LBS | SYSTOLIC BLOOD PRESSURE: 145 MMHG | BODY MASS INDEX: 28.16 KG/M2

## 2024-11-13 DIAGNOSIS — N63.21 UNSPECIFIED LUMP IN THE LEFT BREAST, UPPER OUTER QUADRANT: ICD-10-CM

## 2024-11-13 PROCEDURE — 99214 OFFICE O/P EST MOD 30 MIN: CPT

## 2024-11-26 ENCOUNTER — RESULT REVIEW (OUTPATIENT)
Age: 59
End: 2024-11-26

## 2024-11-26 ENCOUNTER — APPOINTMENT (OUTPATIENT)
Dept: ULTRASOUND IMAGING | Facility: CLINIC | Age: 59
End: 2024-11-26
Payer: COMMERCIAL

## 2024-11-26 PROCEDURE — 76642 ULTRASOUND BREAST LIMITED: CPT | Mod: LT

## 2024-12-03 ENCOUNTER — APPOINTMENT (OUTPATIENT)
Dept: MRI IMAGING | Facility: CLINIC | Age: 59
End: 2024-12-03
Payer: COMMERCIAL

## 2024-12-03 PROCEDURE — 77049 MRI BREAST C-+ W/CAD BI: CPT

## 2024-12-03 PROCEDURE — A9585: CPT

## 2024-12-24 ENCOUNTER — APPOINTMENT (OUTPATIENT)
Dept: BREAST CENTER | Facility: CLINIC | Age: 59
End: 2024-12-24
Payer: COMMERCIAL

## 2024-12-24 VITALS
WEIGHT: 155 LBS | HEIGHT: 62 IN | BODY MASS INDEX: 28.52 KG/M2 | HEART RATE: 102 BPM | SYSTOLIC BLOOD PRESSURE: 135 MMHG | DIASTOLIC BLOOD PRESSURE: 81 MMHG

## 2024-12-24 DIAGNOSIS — Z85.3 ENCOUNTER FOR FOLLOW-UP EXAMINATION AFTER COMPLETED TREATMENT FOR MALIGNANT NEOPLASM: ICD-10-CM

## 2024-12-24 DIAGNOSIS — Z78.9 OTHER SPECIFIED HEALTH STATUS: ICD-10-CM

## 2024-12-24 DIAGNOSIS — Z80.3 FAMILY HISTORY OF MALIGNANT NEOPLASM OF BREAST: ICD-10-CM

## 2024-12-24 DIAGNOSIS — Z80.7 FAMILY HISTORY OF OTHER MALIGNANT NEOPLASMS OF LYMPHOID, HEMATOPOIETIC AND RELATED TISSUES: ICD-10-CM

## 2024-12-24 DIAGNOSIS — Z72.3 LACK OF PHYSICAL EXERCISE: ICD-10-CM

## 2024-12-24 DIAGNOSIS — N64.89 OTHER SPECIFIED DISORDERS OF BREAST: ICD-10-CM

## 2024-12-24 DIAGNOSIS — N63.21 UNSPECIFIED LUMP IN THE LEFT BREAST, UPPER OUTER QUADRANT: ICD-10-CM

## 2024-12-24 DIAGNOSIS — Z08 ENCOUNTER FOR FOLLOW-UP EXAMINATION AFTER COMPLETED TREATMENT FOR MALIGNANT NEOPLASM: ICD-10-CM

## 2024-12-24 DIAGNOSIS — I89.0 LYMPHEDEMA, NOT ELSEWHERE CLASSIFIED: ICD-10-CM

## 2024-12-24 DIAGNOSIS — Z63.5 DISRUPTION OF FAMILY BY SEPARATION AND DIVORCE: ICD-10-CM

## 2024-12-24 DIAGNOSIS — Z80.0 FAMILY HISTORY OF MALIGNANT NEOPLASM OF DIGESTIVE ORGANS: ICD-10-CM

## 2024-12-24 PROCEDURE — 99204 OFFICE O/P NEW MOD 45 MIN: CPT

## 2024-12-24 RX ORDER — ROSUVASTATIN CALCIUM 5 MG/1
5 TABLET, FILM COATED ORAL
Refills: 0 | Status: ACTIVE | COMMUNITY

## 2024-12-24 SDOH — SOCIAL STABILITY - SOCIAL INSECURITY: DISRUPTION OF FAMILY BY SEPARATION AND DIVORCE: Z63.5

## 2025-01-10 RX ORDER — CIPROFLOXACIN HYDROCHLORIDE 250 MG/1
250 TABLET, FILM COATED ORAL
Qty: 6 | Refills: 0 | Status: ACTIVE | COMMUNITY
Start: 2025-01-10 | End: 1900-01-01

## 2025-01-30 ENCOUNTER — APPOINTMENT (OUTPATIENT)
Dept: PHYSICAL MEDICINE AND REHAB | Facility: CLINIC | Age: 60
End: 2025-01-30
Payer: COMMERCIAL

## 2025-01-30 VITALS
RESPIRATION RATE: 14 BRPM | WEIGHT: 155 LBS | HEART RATE: 82 BPM | HEIGHT: 62 IN | SYSTOLIC BLOOD PRESSURE: 130 MMHG | DIASTOLIC BLOOD PRESSURE: 80 MMHG | BODY MASS INDEX: 28.52 KG/M2

## 2025-01-30 DIAGNOSIS — G89.28 OTHER CHRONIC POSTPROCEDURAL PAIN: ICD-10-CM

## 2025-01-30 DIAGNOSIS — R60.9 EDEMA, UNSPECIFIED: ICD-10-CM

## 2025-01-30 DIAGNOSIS — Z85.3 PERSONAL HISTORY OF MALIGNANT NEOPLASM OF BREAST: ICD-10-CM

## 2025-01-30 DIAGNOSIS — I89.0 LYMPHEDEMA, NOT ELSEWHERE CLASSIFIED: ICD-10-CM

## 2025-01-30 PROCEDURE — 99204 OFFICE O/P NEW MOD 45 MIN: CPT

## 2025-02-11 ENCOUNTER — NON-APPOINTMENT (OUTPATIENT)
Age: 60
End: 2025-02-11

## 2025-03-12 ENCOUNTER — APPOINTMENT (OUTPATIENT)
Dept: NEUROLOGY | Facility: CLINIC | Age: 60
End: 2025-03-12
Payer: COMMERCIAL

## 2025-03-12 VITALS
SYSTOLIC BLOOD PRESSURE: 137 MMHG | DIASTOLIC BLOOD PRESSURE: 71 MMHG | OXYGEN SATURATION: 99 % | TEMPERATURE: 98.2 F | WEIGHT: 151.5 LBS | BODY MASS INDEX: 27.88 KG/M2 | HEIGHT: 62 IN | RESPIRATION RATE: 15 BRPM | HEART RATE: 82 BPM

## 2025-03-12 DIAGNOSIS — G35 MULTIPLE SCLEROSIS: ICD-10-CM

## 2025-03-12 PROCEDURE — G2211 COMPLEX E/M VISIT ADD ON: CPT

## 2025-03-12 PROCEDURE — 99215 OFFICE O/P EST HI 40 MIN: CPT

## 2025-03-12 RX ORDER — DIAZEPAM 5 MG/1
5 TABLET ORAL
Qty: 2 | Refills: 0 | Status: ACTIVE | COMMUNITY
Start: 2025-03-12 | End: 1900-01-01

## 2025-06-10 ENCOUNTER — APPOINTMENT (OUTPATIENT)
Dept: DERMATOLOGY | Facility: CLINIC | Age: 60
End: 2025-06-10
Payer: COMMERCIAL

## 2025-06-10 VITALS — BODY MASS INDEX: 27.6 KG/M2 | WEIGHT: 150 LBS | HEIGHT: 62 IN

## 2025-06-10 PROBLEM — Z80.8 FAMILY HISTORY OF MALIGNANT NEOPLASM OF SKIN: Status: ACTIVE | Noted: 2025-06-10

## 2025-06-10 PROCEDURE — 99204 OFFICE O/P NEW MOD 45 MIN: CPT

## 2025-06-10 RX ORDER — TRIAMCINOLONE ACETONIDE 1 MG/G
0.1 OINTMENT TOPICAL
Qty: 1 | Refills: 2 | Status: ACTIVE | COMMUNITY
Start: 2025-06-10 | End: 1900-01-01

## 2025-06-10 RX ORDER — HYDROCORTISONE 25 MG/G
2.5 OINTMENT TOPICAL
Qty: 1 | Refills: 2 | Status: ACTIVE | COMMUNITY
Start: 2025-06-10 | End: 1900-01-01

## 2025-06-13 ENCOUNTER — APPOINTMENT (OUTPATIENT)
Dept: BREAST CENTER | Facility: CLINIC | Age: 60
End: 2025-06-13
Payer: COMMERCIAL

## 2025-06-13 VITALS
DIASTOLIC BLOOD PRESSURE: 77 MMHG | OXYGEN SATURATION: 98 % | SYSTOLIC BLOOD PRESSURE: 156 MMHG | HEART RATE: 79 BPM | HEIGHT: 62 IN | WEIGHT: 150 LBS | BODY MASS INDEX: 27.6 KG/M2

## 2025-06-13 PROBLEM — N63.20 LEFT BREAST LUMP: Status: ACTIVE | Noted: 2025-06-13

## 2025-06-13 PROCEDURE — 99214 OFFICE O/P EST MOD 30 MIN: CPT

## 2025-07-16 ENCOUNTER — NON-APPOINTMENT (OUTPATIENT)
Age: 60
End: 2025-07-16

## 2025-07-16 ENCOUNTER — APPOINTMENT (OUTPATIENT)
Dept: NEUROLOGY | Facility: CLINIC | Age: 60
End: 2025-07-16

## 2025-07-18 ENCOUNTER — APPOINTMENT (OUTPATIENT)
Dept: WOUND CARE | Facility: HOSPITAL | Age: 60
End: 2025-07-18
Payer: COMMERCIAL

## 2025-07-18 ENCOUNTER — OUTPATIENT (OUTPATIENT)
Dept: OUTPATIENT SERVICES | Facility: HOSPITAL | Age: 60
LOS: 1 days | End: 2025-07-18
Payer: COMMERCIAL

## 2025-07-18 VITALS
WEIGHT: 150 LBS | HEART RATE: 76 BPM | HEIGHT: 62 IN | SYSTOLIC BLOOD PRESSURE: 124 MMHG | TEMPERATURE: 97.9 F | RESPIRATION RATE: 14 BRPM | BODY MASS INDEX: 27.6 KG/M2 | DIASTOLIC BLOOD PRESSURE: 69 MMHG | OXYGEN SATURATION: 96 %

## 2025-07-18 DIAGNOSIS — S41.109A UNSPECIFIED OPEN WOUND OF UNSPECIFIED UPPER ARM, INITIAL ENCOUNTER: ICD-10-CM

## 2025-07-18 PROCEDURE — G0463: CPT

## 2025-07-18 PROCEDURE — 99203 OFFICE O/P NEW LOW 30 MIN: CPT

## 2025-07-20 DIAGNOSIS — Z88.2 ALLERGY STATUS TO SULFONAMIDES: ICD-10-CM

## 2025-07-20 DIAGNOSIS — G35 MULTIPLE SCLEROSIS: ICD-10-CM

## 2025-07-20 DIAGNOSIS — Z85.3 PERSONAL HISTORY OF MALIGNANT NEOPLASM OF BREAST: ICD-10-CM

## 2025-07-20 DIAGNOSIS — Z98.890 OTHER SPECIFIED POSTPROCEDURAL STATES: ICD-10-CM

## 2025-07-20 DIAGNOSIS — L30.9 DERMATITIS, UNSPECIFIED: ICD-10-CM

## 2025-07-20 DIAGNOSIS — Z86.19 PERSONAL HISTORY OF OTHER INFECTIOUS AND PARASITIC DISEASES: ICD-10-CM

## 2025-07-20 DIAGNOSIS — I89.0 LYMPHEDEMA, NOT ELSEWHERE CLASSIFIED: ICD-10-CM

## 2025-07-20 DIAGNOSIS — Z92.3 PERSONAL HISTORY OF IRRADIATION: ICD-10-CM

## 2025-07-20 DIAGNOSIS — Z80.8 FAMILY HISTORY OF MALIGNANT NEOPLASM OF OTHER ORGANS OR SYSTEMS: ICD-10-CM

## 2025-07-20 DIAGNOSIS — Z90.13 ACQUIRED ABSENCE OF BILATERAL BREASTS AND NIPPLES: ICD-10-CM

## 2025-07-20 DIAGNOSIS — Z91.048 OTHER NONMEDICINAL SUBSTANCE ALLERGY STATUS: ICD-10-CM

## 2025-07-20 DIAGNOSIS — Z79.899 OTHER LONG TERM (CURRENT) DRUG THERAPY: ICD-10-CM

## 2025-07-20 DIAGNOSIS — Z82.0 FAMILY HISTORY OF EPILEPSY AND OTHER DISEASES OF THE NERVOUS SYSTEM: ICD-10-CM

## 2025-07-20 DIAGNOSIS — Z88.0 ALLERGY STATUS TO PENICILLIN: ICD-10-CM

## 2025-07-21 ENCOUNTER — NON-APPOINTMENT (OUTPATIENT)
Age: 60
End: 2025-07-21

## 2025-07-22 ENCOUNTER — APPOINTMENT (OUTPATIENT)
Dept: DERMATOLOGY | Facility: CLINIC | Age: 60
End: 2025-07-22
Payer: COMMERCIAL

## 2025-07-22 ENCOUNTER — NON-APPOINTMENT (OUTPATIENT)
Age: 60
End: 2025-07-22

## 2025-07-22 VITALS — HEIGHT: 62 IN | WEIGHT: 150 LBS | BODY MASS INDEX: 27.6 KG/M2

## 2025-07-22 DIAGNOSIS — D18.01 HEMANGIOMA OF SKIN AND SUBCUTANEOUS TISSUE: ICD-10-CM

## 2025-07-22 DIAGNOSIS — L82.1 OTHER SEBORRHEIC KERATOSIS: ICD-10-CM

## 2025-07-22 PROCEDURE — 99213 OFFICE O/P EST LOW 20 MIN: CPT

## 2025-08-05 ENCOUNTER — TRANSCRIPTION ENCOUNTER (OUTPATIENT)
Age: 60
End: 2025-08-05

## 2025-08-07 ENCOUNTER — OUTPATIENT (OUTPATIENT)
Dept: OUTPATIENT SERVICES | Facility: HOSPITAL | Age: 60
LOS: 1 days | End: 2025-08-07
Payer: COMMERCIAL

## 2025-08-07 ENCOUNTER — APPOINTMENT (OUTPATIENT)
Dept: WOUND CARE | Facility: CLINIC | Age: 60
End: 2025-08-07

## 2025-08-07 VITALS
RESPIRATION RATE: 16 BRPM | SYSTOLIC BLOOD PRESSURE: 122 MMHG | TEMPERATURE: 98.5 F | DIASTOLIC BLOOD PRESSURE: 76 MMHG | BODY MASS INDEX: 27.6 KG/M2 | HEART RATE: 96 BPM | HEIGHT: 62 IN | WEIGHT: 150 LBS | OXYGEN SATURATION: 95 %

## 2025-08-07 DIAGNOSIS — Z86.19 PERSONAL HISTORY OF OTHER INFECTIOUS AND PARASITIC DISEASES: ICD-10-CM

## 2025-08-07 DIAGNOSIS — L30.9 DERMATITIS, UNSPECIFIED: ICD-10-CM

## 2025-08-07 DIAGNOSIS — Z82.0 FAMILY HISTORY OF EPILEPSY AND OTHER DISEASES OF THE NERVOUS SYSTEM: ICD-10-CM

## 2025-08-07 DIAGNOSIS — I89.0 LYMPHEDEMA, NOT ELSEWHERE CLASSIFIED: ICD-10-CM

## 2025-08-07 DIAGNOSIS — G35 MULTIPLE SCLEROSIS: ICD-10-CM

## 2025-08-07 DIAGNOSIS — Z85.3 PERSONAL HISTORY OF MALIGNANT NEOPLASM OF BREAST: ICD-10-CM

## 2025-08-07 DIAGNOSIS — Z80.8 FAMILY HISTORY OF MALIGNANT NEOPLASM OF OTHER ORGANS OR SYSTEMS: ICD-10-CM

## 2025-08-07 DIAGNOSIS — S41.109A UNSPECIFIED OPEN WOUND OF UNSPECIFIED UPPER ARM, INITIAL ENCOUNTER: ICD-10-CM

## 2025-08-07 DIAGNOSIS — Z88.0 ALLERGY STATUS TO PENICILLIN: ICD-10-CM

## 2025-08-07 DIAGNOSIS — Z79.899 OTHER LONG TERM (CURRENT) DRUG THERAPY: ICD-10-CM

## 2025-08-07 DIAGNOSIS — Z88.2 ALLERGY STATUS TO SULFONAMIDES: ICD-10-CM

## 2025-08-07 DIAGNOSIS — Z92.3 PERSONAL HISTORY OF IRRADIATION: ICD-10-CM

## 2025-08-07 DIAGNOSIS — Z90.13 ACQUIRED ABSENCE OF BILATERAL BREASTS AND NIPPLES: ICD-10-CM

## 2025-08-07 DIAGNOSIS — Z98.890 OTHER SPECIFIED POSTPROCEDURAL STATES: ICD-10-CM

## 2025-08-07 DIAGNOSIS — Z91.048 OTHER NONMEDICINAL SUBSTANCE ALLERGY STATUS: ICD-10-CM

## 2025-08-07 PROCEDURE — 99213 OFFICE O/P EST LOW 20 MIN: CPT

## 2025-08-07 PROCEDURE — G0463: CPT

## 2025-08-14 ENCOUNTER — APPOINTMENT (OUTPATIENT)
Dept: PODIATRY | Facility: CLINIC | Age: 60
End: 2025-08-14
Payer: COMMERCIAL

## 2025-08-14 DIAGNOSIS — Z80.3 FAMILY HISTORY OF MALIGNANT NEOPLASM OF BREAST: ICD-10-CM

## 2025-08-14 DIAGNOSIS — M77.9 ENTHESOPATHY, UNSPECIFIED: ICD-10-CM

## 2025-08-14 DIAGNOSIS — Z80.7 FAMILY HISTORY OF OTHER MALIGNANT NEOPLASMS OF LYMPHOID, HEMATOPOIETIC AND RELATED TISSUES: ICD-10-CM

## 2025-08-14 DIAGNOSIS — Z78.9 OTHER SPECIFIED HEALTH STATUS: ICD-10-CM

## 2025-08-14 DIAGNOSIS — Z72.3 LACK OF PHYSICAL EXERCISE: ICD-10-CM

## 2025-08-14 DIAGNOSIS — Z80.8 FAMILY HISTORY OF MALIGNANT NEOPLASM OF OTHER ORGANS OR SYSTEMS: ICD-10-CM

## 2025-08-14 DIAGNOSIS — Z80.0 FAMILY HISTORY OF MALIGNANT NEOPLASM OF DIGESTIVE ORGANS: ICD-10-CM

## 2025-08-14 DIAGNOSIS — Z63.5 DISRUPTION OF FAMILY BY SEPARATION AND DIVORCE: ICD-10-CM

## 2025-08-14 DIAGNOSIS — M79.673 PAIN IN UNSPECIFIED FOOT: ICD-10-CM

## 2025-08-14 DIAGNOSIS — M25.579 PAIN IN UNSPECIFIED ANKLE AND JOINTS OF UNSPECIFIED FOOT: ICD-10-CM

## 2025-08-14 PROCEDURE — 73600 X-RAY EXAM OF ANKLE: CPT | Mod: 59,LT

## 2025-08-14 PROCEDURE — 11755 BIOPSY NAIL UNIT: CPT | Mod: 59,T4

## 2025-08-14 PROCEDURE — 73620 X-RAY EXAM OF FOOT: CPT | Mod: 59,LT

## 2025-08-14 PROCEDURE — 99203 OFFICE O/P NEW LOW 30 MIN: CPT | Mod: 25

## 2025-08-14 SDOH — SOCIAL STABILITY - SOCIAL INSECURITY: DISRUPTION OF FAMILY BY SEPARATION AND DIVORCE: Z63.5

## 2025-08-15 ENCOUNTER — APPOINTMENT (OUTPATIENT)
Dept: PODIATRY | Facility: CLINIC | Age: 60
End: 2025-08-15

## 2025-08-21 ENCOUNTER — APPOINTMENT (OUTPATIENT)
Dept: ULTRASOUND IMAGING | Facility: CLINIC | Age: 60
End: 2025-08-21
Payer: COMMERCIAL

## 2025-08-21 PROBLEM — M25.579 ANKLE PAIN: Status: ACTIVE | Noted: 2025-08-21

## 2025-08-21 PROBLEM — M79.673 FOOT PAIN: Status: ACTIVE | Noted: 2025-08-21

## 2025-08-21 PROCEDURE — 93971 EXTREMITY STUDY: CPT | Mod: RT

## 2025-08-25 DIAGNOSIS — G35 MULTIPLE SCLEROSIS: ICD-10-CM

## 2025-08-28 ENCOUNTER — APPOINTMENT (OUTPATIENT)
Dept: PHYSICAL MEDICINE AND REHAB | Facility: CLINIC | Age: 60
End: 2025-08-28
Payer: COMMERCIAL

## 2025-08-28 DIAGNOSIS — I89.0 LYMPHEDEMA, NOT ELSEWHERE CLASSIFIED: ICD-10-CM

## 2025-08-28 PROCEDURE — 99214 OFFICE O/P EST MOD 30 MIN: CPT

## 2025-08-29 ENCOUNTER — APPOINTMENT (OUTPATIENT)
Dept: ULTRASOUND IMAGING | Facility: CLINIC | Age: 60
End: 2025-08-29
Payer: COMMERCIAL

## 2025-08-29 PROCEDURE — 76882 US LMTD JT/FCL EVL NVASC XTR: CPT | Mod: LT

## 2025-09-02 PROBLEM — L60.3 DYSTROPHIC NAIL: Status: ACTIVE | Noted: 2025-09-02

## 2025-09-10 ENCOUNTER — APPOINTMENT (OUTPATIENT)
Dept: PODIATRY | Facility: CLINIC | Age: 60
End: 2025-09-10
Payer: COMMERCIAL

## 2025-09-10 DIAGNOSIS — B35.1 TINEA UNGUIUM: ICD-10-CM

## 2025-09-10 DIAGNOSIS — M79.673 PAIN IN UNSPECIFIED FOOT: ICD-10-CM

## 2025-09-10 PROCEDURE — 99213 OFFICE O/P EST LOW 20 MIN: CPT

## 2025-09-10 RX ORDER — CICLOPIROX 71.3 MG/ML
8 SOLUTION TOPICAL
Qty: 6.6 | Refills: 0 | Status: ACTIVE | COMMUNITY
Start: 2025-09-10 | End: 1900-01-01

## 2025-09-17 ENCOUNTER — LABORATORY RESULT (OUTPATIENT)
Age: 60
End: 2025-09-17

## 2025-09-17 ENCOUNTER — APPOINTMENT (OUTPATIENT)
Dept: NEUROLOGY | Facility: CLINIC | Age: 60
End: 2025-09-17
Payer: COMMERCIAL

## 2025-09-17 VITALS
BODY MASS INDEX: 27.79 KG/M2 | DIASTOLIC BLOOD PRESSURE: 74 MMHG | HEART RATE: 78 BPM | WEIGHT: 151 LBS | OXYGEN SATURATION: 99 % | HEIGHT: 62 IN | SYSTOLIC BLOOD PRESSURE: 126 MMHG

## 2025-09-17 DIAGNOSIS — G35 MULTIPLE SCLEROSIS: ICD-10-CM

## 2025-09-17 PROCEDURE — G2211 COMPLEX E/M VISIT ADD ON: CPT

## 2025-09-17 PROCEDURE — 99214 OFFICE O/P EST MOD 30 MIN: CPT

## 2025-09-18 LAB
25(OH)D3 SERPL-MCNC: 44.2 NG/ML
ALBUMIN SERPL ELPH-MCNC: 4.5 G/DL
ALP BLD-CCNC: 75 U/L
ALT SERPL-CCNC: 21 U/L
ANION GAP SERPL CALC-SCNC: 13 MMOL/L
AST SERPL-CCNC: 24 U/L
BASOPHILS # BLD AUTO: 0.04 K/UL
BASOPHILS NFR BLD AUTO: 0.7 %
BILIRUB SERPL-MCNC: 0.6 MG/DL
BUN SERPL-MCNC: 18 MG/DL
CALCIUM SERPL-MCNC: 9.4 MG/DL
CD16+CD56+ CELLS # BLD: 201 CELLS/UL
CD16+CD56+ CELLS NFR BLD: 14 %
CD19 CELLS NFR BLD: 201 CELLS/UL
CD3 CELLS # BLD: 1059 CELLS/UL
CD3 CELLS NFR BLD: 70 %
CD3+CD4+ CELLS # BLD: 763 CELLS/UL
CD3+CD4+ CELLS NFR BLD: 49 %
CD3+CD4+ CELLS/CD3+CD8+ CLL SPEC: 2.3 RATIO
CD3+CD8+ CELLS # SPEC: 332 CELLS/UL
CD3+CD8+ CELLS NFR BLD: 21 %
CELLS.CD3-CD19+/CELLS IN BLOOD: 14 %
CHLORIDE SERPL-SCNC: 102 MMOL/L
CO2 SERPL-SCNC: 24 MMOL/L
CREAT SERPL-MCNC: 0.82 MG/DL
DEPRECATED KAPPA LC FREE/LAMBDA SER: 1.19 RATIO
EGFRCR SERPLBLD CKD-EPI 2021: 81 ML/MIN/1.73M2
EOSINOPHIL # BLD AUTO: 0.05 K/UL
EOSINOPHIL NFR BLD AUTO: 0.9 %
GLUCOSE SERPL-MCNC: 107 MG/DL
HBV CORE IGG+IGM SER QL: NONREACTIVE
HBV CORE IGM SER QL: NONREACTIVE
HBV SURFACE AB SER QL: NONREACTIVE
HBV SURFACE AG SER QL: NONREACTIVE
HCT VFR BLD CALC: 41.7 %
HGB BLD-MCNC: 13 G/DL
IGA SERPL-MCNC: 119 MG/DL
IGG SERPL-MCNC: 812 MG/DL
IGM SERPL-MCNC: 316 MG/DL
IMM GRANULOCYTES NFR BLD AUTO: 0.4 %
KAPPA LC CSF-MCNC: 1.16 MG/DL
KAPPA LC SERPL-MCNC: 1.38 MG/DL
LYMPHOCYTES # BLD AUTO: 1.52 K/UL
LYMPHOCYTES NFR BLD AUTO: 28 %
MAN DIFF?: NORMAL
MCHC RBC-ENTMCNC: 27.7 PG
MCHC RBC-ENTMCNC: 31.2 G/DL
MCV RBC AUTO: 88.7 FL
MONOCYTES # BLD AUTO: 0.42 K/UL
MONOCYTES NFR BLD AUTO: 7.7 %
NEUTROPHILS # BLD AUTO: 3.37 K/UL
NEUTROPHILS NFR BLD AUTO: 62.3 %
PLATELET # BLD AUTO: 338 K/UL
POTASSIUM SERPL-SCNC: 5 MMOL/L
PROT SERPL-MCNC: 7.1 G/DL
RBC # BLD: 4.7 M/UL
RBC # FLD: 13.3 %
SODIUM SERPL-SCNC: 140 MMOL/L
VIABILITY: NORMAL
VZV AB TITR SER: POSITIVE
VZV IGG SER IF-ACNC: 12.8 S/CO
WBC # FLD AUTO: 5.42 K/UL

## 2025-09-18 RX ORDER — DIMETHYL FUMARATE 240 MG/1
240 CAPSULE, DELAYED RELEASE ORAL
Qty: 60 | Refills: 5 | Status: ACTIVE | COMMUNITY
Start: 2025-09-17 | End: 1900-01-01

## 2025-09-19 LAB — NFL CHAIN SERPL IA-MCNC: 11.1 PG/ML

## 2025-09-22 LAB
M TB IFN-G BLD-IMP: NEGATIVE
QUANTIFERON TB PLUS MITOGEN MINUS NIL: >10 IU/ML
QUANTIFERON TB PLUS NIL: 0.02 IU/ML
QUANTIFERON TB PLUS TB1 MINUS NIL: 0 IU/ML
QUANTIFERON TB PLUS TB2 MINUS NIL: 0 IU/ML
VZV IGM SER IF-ACNC: 1.23 INDEX

## 2025-09-25 LAB
JCV INDEX: 0.27
STRATIFY JCV ANTIBODY: ABNORMAL